# Patient Record
Sex: MALE | URBAN - METROPOLITAN AREA
[De-identification: names, ages, dates, MRNs, and addresses within clinical notes are randomized per-mention and may not be internally consistent; named-entity substitution may affect disease eponyms.]

---

## 2022-12-15 ENCOUNTER — APPOINTMENT (RX ONLY)
Dept: URBAN - METROPOLITAN AREA CLINIC 55 | Facility: CLINIC | Age: 47
Setting detail: DERMATOLOGY
End: 2022-12-15

## 2022-12-15 DIAGNOSIS — Z41.9 ENCOUNTER FOR PROCEDURE FOR PURPOSES OTHER THAN REMEDYING HEALTH STATE, UNSPECIFIED: ICD-10-CM

## 2022-12-15 PROCEDURE — ? SIGNATURE HYDRAFACIAL

## 2022-12-15 PROCEDURE — ? INVENTORY

## 2022-12-15 ASSESSMENT — LOCATION DETAILED DESCRIPTION DERM: LOCATION DETAILED: GLABELLA

## 2022-12-15 ASSESSMENT — LOCATION ZONE DERM: LOCATION ZONE: FACE

## 2022-12-15 ASSESSMENT — LOCATION SIMPLE DESCRIPTION DERM: LOCATION SIMPLE: GLABELLA

## 2022-12-15 NOTE — PROCEDURE: SIGNATURE HYDRAFACIAL
Number Of Passes: 0
Tip Override
Indication: acne
Procedure: Boost
Tip: Hydropeel Tip, Teal
Procedure: Fusion
Solution: Beta-HD
Procedure: Exfoliation
Procedure: Extend and Protect
Location: face
Solution Override
Procedure: Peel
Solution: Activ-4
Procedure: Extraction
Solution: GlySal 7.5%
Tip: Hydropeel Tip, Clear
Consent: Written consent obtained, risks reviewed including but not limited to crusting, scabbing, blistering, scarring, darker or lighter pigmentary change, bruising, and/or incomplete response.
Treatment Number: 1
Post-Care Instructions: I reviewed with the patient in detail post-care instructions. Patient should stay away from the sun and wear sun protection until treated areas are fully healed.
Tip: Hydropeel Tip, Blue

## 2025-07-01 ENCOUNTER — APPOINTMENT (OUTPATIENT)
Dept: URBAN - METROPOLITAN AREA CLINIC 55 | Facility: CLINIC | Age: 50
Setting detail: DERMATOLOGY
End: 2025-07-01

## 2025-07-01 DIAGNOSIS — Z41.9 ENCOUNTER FOR PROCEDURE FOR PURPOSES OTHER THAN REMEDYING HEALTH STATE, UNSPECIFIED: ICD-10-CM

## 2025-07-01 PROCEDURE — ? DYSPORT

## 2025-07-01 PROCEDURE — ? INVENTORY

## 2025-07-01 PROCEDURE — ? COSMETIC CONSULTATION: BOTULINUM TOXIN

## 2025-07-01 NOTE — PROCEDURE: DYSPORT
Show Lateral Platysmal Band Units: Yes
[facility] Units: 0
Show Ucl Units: No
Forehead Units: 5
Detail Level: Detailed
Glabellar Complex Units: 50
Additional Area 1 Location: upper lip cutaneous
Show Inventory Tab: Show
Additional Area 2 Location: chin
Consent obtained and risk reviewed.
Post-Care Instructions: Patient instructed to not lie down flat for 4 hours or rub the treatment area.
Additional Area 3 Location: jawline
Periorbital Skin Units: 20
Dilution (U/0.1 Cc): 10